# Patient Record
Sex: FEMALE | Race: WHITE
[De-identification: names, ages, dates, MRNs, and addresses within clinical notes are randomized per-mention and may not be internally consistent; named-entity substitution may affect disease eponyms.]

---

## 2017-06-01 ENCOUNTER — HOSPITAL ENCOUNTER (OUTPATIENT)
Dept: HOSPITAL 45 - C.MAMM | Age: 82
Discharge: HOME | End: 2017-06-01
Payer: COMMERCIAL

## 2017-06-01 DIAGNOSIS — Z12.31: Primary | ICD-10-CM

## 2017-06-01 DIAGNOSIS — Z85.3: ICD-10-CM

## 2017-06-02 NOTE — MAMMOGRAPHY REPORT
BILATERAL DIGITAL SCREENING MAMMOGRAM TOMOSYNTHESIS WITH CAD: 6/1/2017

CLINICAL HISTORY: Asymptomatic. Personal history of breast cancer.  





TECHNIQUE:  Breast tomosynthesis in addition to standard 2D mammography was performed. Current study 
was also evaluated with a Computer Aided Detection (CAD) system.  



COMPARISON: Comparison is made to exams dated:  5/31/2016 mammogram, 7/8/2015 mammogram, 11/26/2013 m
ammogram, 12/2/2014 mammogram, and 12/5/2012 mammogram - [facility].   



BREAST COMPOSITION:  There are scattered areas of fibroglandular density in both breasts.  



FINDINGS: There is evidence of prior surgery in both breasts, with expected architectural distortion 
in each upper outer quadrant.  There are benign appearing coarse and rodlike calcifications throughou
t the left breast and benign rim calcifications in the right breast.  A stable ribbon shaped metallic
 biopsy marker in the left upper outer middle one third of the breast.  No new suspicious mass, archi
tectural distortion or cluster of microcalcifications is seen.  



IMPRESSION:  ACR BI-RADS CATEGORY 1: NEGATIVE

There is no mammographic evidence of malignancy. A 1 year screening mammogram is recommended.  The pa
tient will receive written notification of the results.  





Approximately 10% of breast cancers are not detected with mammography. A negative mammographic report
 should not delay biopsy if a clinically suggestive mass is present.



Lina Landaverde M.D.          

ay/:6/1/2017 17:06:44  



Imaging Technologist: Danika MALAVE(AYESHA)(GERSON)(BD), [facility]

letter sent: Normal 1/2  

BI-RADS Code: ACR BI-RADS Category 1: Negative

## 2017-08-16 ENCOUNTER — HOSPITAL ENCOUNTER (OUTPATIENT)
Dept: HOSPITAL 45 - C.CTS | Age: 82
Discharge: HOME | End: 2017-08-16
Payer: COMMERCIAL

## 2017-08-16 DIAGNOSIS — R91.1: Primary | ICD-10-CM

## 2017-08-16 DIAGNOSIS — Z85.3: ICD-10-CM

## 2017-08-16 DIAGNOSIS — R91.8: ICD-10-CM

## 2017-08-16 NOTE — DIAGNOSTIC IMAGING REPORT
CT SCAN OF THE CHEST WITHOUT IV CONTRAST



CLINICAL HISTORY: Follow-up pulmonary nodules. Remote history of breast and

ovarian cancer.



COMPARISON STUDY:  Abdominal CT dated 9/12/2016.



TECHNIQUE:  CT scan of the thorax was performed from the thoracic inlet to the

upper abdomen. Images are reviewed in the axial, sagittal, and coronal planes.

IV contrast was not administered for this examination as per the referring

clinician.  A dose lowering technique was utilized adhering to the principles of

ALARA.



CT DOSE: 239.30 mGycm



FINDINGS:



Thyroid: Imaged portions of the thyroid gland are normal in size and

attenuation.



Thoracic aorta: There is atherosclerotic calcification of the thoracic aorta,

which is normal in caliber and demonstrates standard 3-vessel arch anatomy.



Heart: The heart is normal in size and without pericardial effusion. There are

coronary artery calcifications.



Lungs and pleural spaces: There is no airspace consolidation or pleural

effusion. Mild emphysema is identified. Linear scarring versus atelectasis is

present at the left lung base and in the right middle lobe. The trachea and

central airways are clear. There is a 2.0 x 1.6 cm nodule in the peritoneum

mediastinal right upper lobe seen on axial image #49. This contains foci of

macroscopic fat and calcifications, and is typical in appearance for a benign

hamartoma. There are scattered (less than 10) additional subcentimeter pulmonary

nodules seen throughout both lungs. The largest measures 5 mm and is seen in the

right middle lobe on image #156. Additional representative nodules are seen in

the right upper lobe on image #88, the right middle lobe on image #165, and the

left upper lobe on images #45 and #56.



Mediastinum:  There is no mediastinal lymphadenopathy.



[place]: Not well assessed without IV contrast.



Axillae: Surgical clips are noted in the left axilla. No axillary

lymphadenopathy is seen.



Upper abdomen: A calcified granuloma is noted in the liver. There is a tiny

hiatal hernia. A 1.3 cm left adrenal nodule meets CT criteria for a

fat-containing adenoma.



Skeletal structures: The skeletal structures are osteopenic. No lytic or blastic

bony lesions are seen.





IMPRESSION:



1. There is no convincing evidence of intrathoracic metastatic disease.



2. There is no airspace consolidation or pleural effusion.



3. There is a 2.0 cm nodule in the right upper lobe with this contains

calcifications and macroscopic fat and is typical in appearance for a benign

hamartoma. Correlation with any prior outside imaging studies is recommended to

document stability.



4. There are scattered (less than 10) indeterminant but low suspicion pulmonary

nodules measuring up to 5 mm. Correlation with any prior outside imaging studies

is recommended to assess for long-term stability. If no prior studies are

available then these can be followed as per the Fleischner criteria. See below.



5. Additional findings as above.















Please refer to below summary of Fleischner criteria recommendations for

follow-up of incidental CT nodules (TERRY Rojas, Guidelines for management of

small pulmonary nodules detected on CT scans:  A statement from the Fleischner

Society, Radiology 237: [phone].)



SOLID NODULES



Solitary nodule size: <6 mm

*  low risk patients:  no follow-up needed

*  high risk patients:  optional CT at 12 months



Solitary nodule size:  6-8 mm

*  low risk patients:  follow-up at 6-12 months, then consider further follow-up

at 18-24 months

*  high risk patients:  initial follow-up CT at 6-12 months and then at 18-24

months if no change



Solitary nodule size: >8 mm

*  either low or high risk patients - consider follow-up CT at 3 months, and/or

CT-PET, and/or biopsy



Multiple nodules size:  <6 mm

*  low risk patients:  no routine follow-up

*  high risk patients:  optional CT at 12 months



Multiple nodules size:  6-8 mm

*  low risk patients:  follow-up at 3-6 months, then consider further follow-up

at 18-24 months

*  high risk patients:  follow-up at 3-6 months, then at 18-24 months if no

change



Multiple nodules size:  >8 mm

*  low risk patients:  follow-up at 3-6 months, then consider further follow-up

at 18-24 months

*  high risk patients:  follow-up at 3-6 months, then at 18-24 months if no

change



Note:  newly detected indeterminate nodule in persons 35 years of age or older.

*  low risk patients:  minimal or absent history of smoking and/or other known

risk factors

*  high risk patients:  history of smoking or of other known risk factors (e.g.

first degree relative with lung cancer, or exposure to asbestos, radon, uranium)

*  if a nodule up to 8 mm is partly solid or is ground glass further follow-up

is required after 24 months to exclude possible slow growing adenocarcinoma

(KITTY)



SUBSOLID NODULES



Solitary pure ground-glass nodule

*  nodule size <6 mm - no CT follow-up required

*  nodule size >=6 mm - follow-up CT at 6-12 months, then every 2 years until 5

years



Solitary part-solid nodule

*  nodule size <6 mm - no CT follow-up required

*  nodule size >=6 mm - follow-up CT at 3-6 months.  If unchanged, and solid

component remains <6 mm, then annual follow-up for 5 years



Multiple subsolid nodules

*  nodule size <6 mm - follow-up CT at 3-6 months, consider further follow-up at

2 and 4 years if stable

*  nodule size >=6 mm - follow-up CT at 3-6 months, subsequent management based

on the most suspicious nodule(s)





       







Electronically signed by:  Mark Anthony Aranda M.D.

8/16/2017 11:27 AM



Dictated Date/Time:  8/16/2017 11:15 AM

## 2017-09-13 ENCOUNTER — HOSPITAL ENCOUNTER (OUTPATIENT)
Dept: HOSPITAL 45 - C.MRI | Age: 82
Discharge: HOME | End: 2017-09-13
Payer: COMMERCIAL

## 2017-09-13 DIAGNOSIS — Z15.02: ICD-10-CM

## 2017-09-13 DIAGNOSIS — Z85.3: Primary | ICD-10-CM

## 2017-09-13 DIAGNOSIS — Z15.01: ICD-10-CM

## 2017-09-13 NOTE — MAMMOGRAPHY REPORT
BREAST MRI OF BOTH BREASTS : 9/13/2017

CLINICAL HISTORY: 82-year-old woman with a prior history of left breast cancer status post breast con
servation treatment.  A recent benign ultrasound guided biopsy in the left breast.  Also remote prior
 surgery in the right breast.  





COMPARISON: Comparison is made to exams dated:  8/29/2016 breast MRI, 5/31/2016 mammogram, 7/8/2015 m
ammogram, 7/8/2015 ultrasound biopsy, 6/1/2017 mammogram, and 6/26/2015 breast MRI - [facility].   



TECHNIQUE: Using a 1.5 Kathy magnet and dedicated breast coil, multisequence axial images were obtain
ed through the breasts.  After uneventful IV administration of 6 mL of Gadavist, dynamic multiphase c
ontrast-enhanced axial images, and sagittal postcontrast were obtained.  Temporal subtraction axial i
mages and 3-D MIP images are provided.  Everything was then reviewed on a 3-D workstation, [employer]!.



FINDINGS: There is no significant background parenchymal enhancement in the breasts.  There is eviden
ce of prior surgery in both breasts, with architecture distortion noted in the upper outer posterior 
right breast.  There is a larger area of distortion in the left upper outer posterior breast, with se
veral areas of susceptibility artifact identified and there is asymmetry of the size of the breasts, 
right greater than left.  There is no evidence of a new suspicious enhancing mass, non-mass enhanceme
nt or suspicious kinetics bilaterally.  No focal skin thickening or nipple retraction.  No suspicious
 axillary lymphadenopathy bilaterally.



Note is made of linear atelectatic changes in the dependent portion of the visualized right lung.



IMPRESSION: ACR BI-RADS CATEGORY 2: BENIGN 

Stable breast MRI, without evidence of malignancy.  Continuation of annual screening mammography and 
screening MRI schedule is recommended.



The patient will receive written notification of the results.  





Lina Landaverde M.D.  

ay/:9/13/2017 16:38:04  



Imaging Technologist: MRI Technologist, [facility]

letter sent: Normal 1/2  

BI-RADS Code: ACR BI-RADS Category 2: Benign

## 2018-08-08 ENCOUNTER — HOSPITAL ENCOUNTER (OUTPATIENT)
Dept: HOSPITAL 45 - C.MAMM | Age: 83
Discharge: HOME | End: 2018-08-08
Attending: FAMILY MEDICINE
Payer: COMMERCIAL

## 2018-08-08 DIAGNOSIS — Z85.3: ICD-10-CM

## 2018-08-08 DIAGNOSIS — Z12.31: Primary | ICD-10-CM

## 2018-08-09 NOTE — MAMMOGRAPHY REPORT
BILATERAL DIGITAL SCREENING MAMMOGRAM TOMOSYNTHESIS WITH CAD: 8/8/2018

CLINICAL HISTORY: Asymptomatic. Personal history of breast cancer.  





TECHNIQUE: The study was acquired using full field digital technology and interpreted from soft copy.
 Breast tomosynthesis in addition to standard 2D mammography was performed. Current study was also ev
aluated with a Computer Aided Detection (CAD) system.  



COMPARISON: Comparison is made to exams dated:  6/1/2017 mammogram, 5/31/2016 mammogram, 7/8/2015 francis
mogram, 6/26/2015 breast MRI, 12/2/2014 mammogram, and 11/26/2013 mammogram - [facility]
enter.   

BREAST COMPOSITION: There are scattered areas of fibroglandular density in both breasts.  



FINDINGS: Linear scar markers overlie the upper outer quadrant of each breast.  There is expected arc
hitectural distortion in the upper outer posterior aspect of each breast, at sites of prior surgery. 
 There is also coarse dystrophic calcification at the surgical site in the left upper outer breast.  
A stable ribbon-shaped biopsy clip is seen in the upper outer left breast as well.

No new suspicious mass, architectural distortion or cluster of microcalcifications is seen.  



IMPRESSION: ACR BI-RADS CATEGORY 1: NEGATIVE

There is no mammographic evidence of malignancy. A 1 year screening mammogram is recommended.(08/09/2
019)  The patient will receive written notification of the results.  





Some breast cancers are not detected with mammography. A negative mammographic report should not carlee
y biopsy if a clinically suggestive mass is present.



Lina Landaverde M.D.          

ay/:8/8/2018 16:09:18  



Imaging Technologist: RT Rito(AYESHA)(M), [facility]

letter sent: Normal 1/2  

BI-RADS Code: ACR BI-RADS Category 1: Negative